# Patient Record
Sex: FEMALE | Race: WHITE | NOT HISPANIC OR LATINO | Employment: FULL TIME | URBAN - METROPOLITAN AREA
[De-identification: names, ages, dates, MRNs, and addresses within clinical notes are randomized per-mention and may not be internally consistent; named-entity substitution may affect disease eponyms.]

---

## 2018-12-18 ENCOUNTER — OFFICE VISIT (OUTPATIENT)
Dept: URGENT CARE | Facility: CLINIC | Age: 33
End: 2018-12-18
Payer: MEDICAID

## 2018-12-18 VITALS
BODY MASS INDEX: 24.44 KG/M2 | SYSTOLIC BLOOD PRESSURE: 121 MMHG | DIASTOLIC BLOOD PRESSURE: 81 MMHG | HEIGHT: 69 IN | RESPIRATION RATE: 18 BRPM | HEART RATE: 89 BPM | WEIGHT: 165 LBS | TEMPERATURE: 99 F | OXYGEN SATURATION: 98 %

## 2018-12-18 DIAGNOSIS — S00.31XA NASAL ABRASION, INITIAL ENCOUNTER: ICD-10-CM

## 2018-12-18 DIAGNOSIS — J98.4 PULMONARY LESION: ICD-10-CM

## 2018-12-18 DIAGNOSIS — R06.2 WHEEZING: Primary | ICD-10-CM

## 2018-12-18 PROCEDURE — 71046 X-RAY EXAM CHEST 2 VIEWS: CPT | Mod: S$GLB,,, | Performed by: RADIOLOGY

## 2018-12-18 PROCEDURE — 94640 AIRWAY INHALATION TREATMENT: CPT | Mod: 59,S$GLB,, | Performed by: PHYSICIAN ASSISTANT

## 2018-12-18 PROCEDURE — 94664 DEMO&/EVAL PT USE INHALER: CPT | Mod: 59,S$GLB,, | Performed by: PHYSICIAN ASSISTANT

## 2018-12-18 PROCEDURE — 99205 OFFICE O/P NEW HI 60 MIN: CPT | Mod: 25,S$GLB,, | Performed by: PHYSICIAN ASSISTANT

## 2018-12-18 RX ORDER — MUPIROCIN 20 MG/G
OINTMENT TOPICAL
Qty: 22 G | Refills: 1 | Status: SHIPPED | OUTPATIENT
Start: 2018-12-18 | End: 2019-01-10 | Stop reason: ALTCHOICE

## 2018-12-18 RX ORDER — VENLAFAXINE HYDROCHLORIDE 225 MG/1
TABLET, EXTENDED RELEASE ORAL
Refills: 5 | COMMUNITY
Start: 2018-11-13

## 2018-12-18 RX ORDER — PREDNISONE 10 MG/1
TABLET ORAL
Qty: 18 TABLET | Refills: 0 | Status: SHIPPED | OUTPATIENT
Start: 2018-12-18 | End: 2019-01-18 | Stop reason: ALTCHOICE

## 2018-12-18 RX ORDER — TRAZODONE HYDROCHLORIDE 100 MG/1
100 TABLET ORAL NIGHTLY
Status: ON HOLD | COMMUNITY
Start: 2017-11-04 | End: 2019-05-21 | Stop reason: HOSPADM

## 2018-12-18 RX ORDER — ALBUTEROL SULFATE 0.83 MG/ML
2.5 SOLUTION RESPIRATORY (INHALATION)
Status: COMPLETED | OUTPATIENT
Start: 2018-12-18 | End: 2018-12-18

## 2018-12-18 RX ORDER — GABAPENTIN 300 MG/1
CAPSULE ORAL
Refills: 5 | COMMUNITY
Start: 2018-11-13 | End: 2019-01-18

## 2018-12-18 RX ORDER — MODAFINIL 100 MG/1
200 TABLET ORAL DAILY
COMMUNITY
End: 2019-02-07 | Stop reason: CLARIF

## 2018-12-18 RX ORDER — PROPRANOLOL HYDROCHLORIDE 20 MG/1
TABLET ORAL
Status: ON HOLD | COMMUNITY
Start: 2017-11-21 | End: 2019-02-19 | Stop reason: HOSPADM

## 2018-12-18 RX ADMIN — ALBUTEROL SULFATE 2.5 MG: 0.83 SOLUTION RESPIRATORY (INHALATION) at 02:12

## 2018-12-18 NOTE — PROGRESS NOTES
"Subjective:       Patient ID: Rola Krause is a 33 y.o. female.    Vitals:  height is 5' 9" (1.753 m) and weight is 74.8 kg (165 lb). Her oral temperature is 98.8 °F (37.1 °C). Her blood pressure is 121/81 and her pulse is 89. Her respiration is 18 and oxygen saturation is 98%.     Chief Complaint: Cough    Pt states wheezing for 10 days cough getting less. Did breathing treatment but no relief      Cough   This is a new problem. The current episode started 1 to 4 weeks ago. The problem has been gradually worsening. The problem occurs constantly. Pertinent negatives include no chills, ear pain, eye redness, fever, hemoptysis, myalgias, rash, sore throat, shortness of breath or wheezing. Nothing aggravates the symptoms. She has tried steroid inhaler for the symptoms. The treatment provided no relief.       Constitution: Negative for chills, sweating, fatigue and fever.   HENT: Negative for ear pain, congestion, sinus pain, sinus pressure, sore throat and voice change.    Neck: Negative for painful lymph nodes.   Eyes: Negative for eye redness.   Respiratory: Positive for cough. Negative for chest tightness, sputum production, bloody sputum, COPD, shortness of breath, stridor, wheezing and asthma.    Gastrointestinal: Negative for nausea and vomiting.   Musculoskeletal: Negative for muscle ache.   Skin: Negative for rash.   Allergic/Immunologic: Negative for seasonal allergies and asthma.   Hematologic/Lymphatic: Negative for swollen lymph nodes.       Objective:      Physical Exam   Constitutional: She is oriented to person, place, and time. She appears well-developed and well-nourished. She is cooperative.  Non-toxic appearance. She does not appear ill. No distress.   HENT:   Head: Normocephalic and atraumatic.   Right Ear: Hearing, tympanic membrane, external ear and ear canal normal.   Left Ear: Hearing, tympanic membrane, external ear and ear canal normal.   Nose: Nose normal. No mucosal edema, " rhinorrhea or nasal deformity. No epistaxis. Right sinus exhibits no maxillary sinus tenderness and no frontal sinus tenderness. Left sinus exhibits no maxillary sinus tenderness and no frontal sinus tenderness.   Mouth/Throat: Uvula is midline, oropharynx is clear and moist and mucous membranes are normal. No trismus in the jaw. Normal dentition. No uvula swelling. No posterior oropharyngeal erythema.   Eyes: Conjunctivae and lids are normal. No scleral icterus.   Sclera clear bilat   Neck: Trachea normal, full passive range of motion without pain and phonation normal. Neck supple.   Cardiovascular: Normal rate, regular rhythm, normal heart sounds, intact distal pulses and normal pulses.   Pulmonary/Chest: Effort normal. No respiratory distress. She has decreased breath sounds. She has wheezes (inspiratory and expiratory) in the right upper field, the right middle field, the right lower field, the left upper field, the left middle field and the left lower field.   Abdominal: Soft. Normal appearance and bowel sounds are normal. She exhibits no distension. There is no tenderness.   Musculoskeletal: Normal range of motion. She exhibits no edema or deformity.   Neurological: She is alert and oriented to person, place, and time. She exhibits normal muscle tone. Coordination normal.   Skin: Skin is warm, dry and intact. She is not diaphoretic. No pallor.   Psychiatric: She has a normal mood and affect. Her speech is normal and behavior is normal. Judgment and thought content normal. Cognition and memory are normal.   Nursing note and vitals reviewed.      Assessment:       1. Wheezing    2. Pulmonary lesion    3. Nasal abrasion, initial encounter        Plan:         Wheezing  -     XR CHEST PA AND LATERAL; Future; Expected date: 12/18/2018  -     Ambulatory referral to Pulmonology  -     predniSONE (DELTASONE) 10 MG tablet; Take two pills po x 5 days, 1 pill po x 5 days, 1/2 pill po until empty. Start 24 hours after  injection.  Dispense: 18 tablet; Refill: 0  -     albuterol nebulizer solution 2.5 mg    Pulmonary lesion  -     Ambulatory referral to Pulmonology  -     predniSONE (DELTASONE) 10 MG tablet; Take two pills po x 5 days, 1 pill po x 5 days, 1/2 pill po until empty. Start 24 hours after injection.  Dispense: 18 tablet; Refill: 0  -     albuterol nebulizer solution 2.5 mg    Nasal abrasion, initial encounter  -     mupirocin (BACTROBAN) 2 % ointment; Apply to affected area 3 times daily  Dispense: 22 g; Refill: 1    Xr Chest Pa And Lateral    Result Date: 12/18/2018  EXAMINATION: XR CHEST PA AND LATERAL CLINICAL HISTORY: Wheezing TECHNIQUE: PA and lateral views of the chest were performed. COMPARISON: None FINDINGS: Consolidation is noted extending to the right lung hilum in the right upper lung zone.  Infectious and neoplastic etiologies are on the differential and follow-up for resolution along with clinical correlation is suggested.  If this fails to resolve CT should be performed.     Consolidation extending to the superior right lung hilum of uncertain etiology, clinical correlation and close follow-up for resolution and/or CT imaging may be of use Electronically signed by: Damian Pinon MD Date:    12/18/2018 Time:    13:52   We discussed imaging findings, physical exam and treatment options at length.  She states that she is getting private insurance of beginning of the year, at which time she will follow-up with pulmonology.  I will provide her with an adult dose her nebulizer solution, for she was using her son's nebulizer treatment at home with left benefit.  Her lung sounds have improved post nebulizer treatment, she states that she can breathe better.  She can continue suggest as needed as well as the above medications.    Patient Instructions   [facility] care at [facility]   Phone: [phone]      Pulmonary Nodule  A pulmonary nodule is small area of abnormal tissue in the lung. It is usually  found on an X-ray taken for other reasons. It is a single spot (lesion) up to about an inch in size, surrounded by normal lung tissue.  Most nodules are not cancerous (benign). However, a nodule could be an early stage of lung cancer. Or it may be a sign of cancer that has spread from another part of the body. When a nodule is found on a chest X-ray, further testing is needed to determine if it is benign or cancerous (malignant). To give your healthcare provider more information about the nodule, you may have one or more of these tests:  · Comparison of a new X-ray to earlier X-rays  · Chest CT scan  · Bronchoscopy (a procedure that allows the healthcare provider to see the air passages inside the lung)  · Needle biopsy  Test results  · If your nodule is benign, continued follow-up over the next 5 years is usually advised.  · If tests do not determine whether your nodule is benign or malignant, surgery may be advised.  · If tests show that the nodule is definitely malignant, surgery will probably be advised.  The best survival rates from lung cancer occur when the original tumor is small (less than 1 inch). Follow your healthcare provider's advice on the timing of further testing. Prompt treatment gives the best chance of curing lung cancer.  Prevention  Smoking remains one of the biggest risk factors for lung cancer. If you smoke, it is essential that you quit to lower your risk of lung cancer. Talk to your healthcare provider about things that can help you quit, including medicines and support groups. See the following websites for more information:  · www.smokefree.gov  · www.quitnet.com  Home care  Most people with a pulmonary nodule have no symptoms. So no special home care is required. You may return to your usual activities and diet.  Follow-up care  Follow up with your healthcare provider, or as advised.  More information about lung cancer is available from these resources:  · American Lung Association:  641.533.9410, www.lung.org  · National Cancer [place]: 117.447.6950, www.cancer.gov  When to seek medical advice  Call your healthcare provider right away if any of these occur:  · Fever of 100.4°F (38°C) or higher  · Unintended weight change  Call 911  Contact emergency services right away if any of these occur:  · Coughing up blood  · Chest pain or shortness of breath  Date Last Reviewed: 9/13/2015  © 4805-7448 [employer]. [address] [place] [address]. All rights reserved. This information is not intended as a substitute for professional medical care. Always follow your healthcare professional's instructions.        Bronchitis, Viral (Adult)    You have a viral bronchitis. Bronchitis is inflammation and swelling of the lining of the lungs. This is often caused by an infection. Symptoms include a dry, hacking cough that is worse at night. The cough may bring up yellow-green mucus. You may also feel short of breath or wheeze. Other symptoms may include tiredness, chest discomfort, and chills.  Bronchitis that is caused by a virus is not treated with antibiotics. Instead, medicines may be given to help relieve symptoms. Symptoms can last up to 2 weeks, although the cough may last much longer.  This illness is contagious during the first few days and is spread through the air by coughing and sneezing, or by direct contact (touching the sick person and then touching your own eyes, nose, or mouth).  Most viral illnesses resolve within 10 to 14 days with rest and simple home remedies, although they may sometimes last for several weeks.  Home care  · If symptoms are severe, rest at home for the first 2 to 3 days. When you go back to your usual activities, don't let yourself get too tired.  · Do not smoke. Also avoid being exposed to secondhand smoke.  · You may use over-the-counter medicine to control fever or pain, unless another pain medicine was prescribed. (Note: If you have chronic liver  or kidney disease or have ever had a stomach ulcer or gastrointestinal bleeding, talk with your healthcare provider before using these medicines. Also talk to your provider if you are taking medicine to prevent blood clots.) Aspirin should never be given to anyone younger than 18 years of age who is ill with a viral infection or fever. It may cause severe liver or brain damage.  · Your appetite may be poor, so a light diet is fine. Avoid dehydration by drinking 6 to 8 glasses of fluids per day (such as water, soft drinks, sports drinks, juices, tea, or soup). Extra fluids will help loosen secretions in the nose and lungs.  · Over-the-counter cough, cold, and sore-throat medicines will not shorten the length of the illness, but they may help to reduce symptoms. (Note: Do not use decongestants if you have high blood pressure.)  Follow-up care  Follow up with your healthcare provider, or as advised. If you had an X-ray or ECG (electrocardiogram), a specialist will review it. You will be notified of any new findings that may affect your care.  Note: If you are age 65 or older, or if you have a chronic lung disease or condition that affects your immune system, or you smoke, talk to your healthcare provider about having pneumococcal vaccinations and a yearly influenza vaccination (flu shot).  When to seek medical advice  Call your healthcare provider right away if any of these occur:  · Fever of 100.4°F (38°C) or higher  · Coughing up increased amounts of colored sputum  · Weakness, drowsiness, headache, facial pain, ear pain, or a stiff neck  Call 911, or get immediate medical care  Contact emergency services right away if any of these occur:  · Coughing up blood  · Worsening weakness, drowsiness, headache, or stiff neck  · Trouble breathing, wheezing, or pain with breathing  Date Last Reviewed: 9/13/2015  © 3691-4261 [employer]. [address] [place] [address], [place] [address]. All rights reserved. This  information is not intended as a substitute for professional medical care. Always follow your healthcare professional's instructions.    If you were prescribed a narcotic medication, do not drive or operate heavy equipment or machinery while taking these medications.  You must understand that you've received an Urgent Care treatment only and that you may be released before all your medical problems are known or treated. You, the patient, will arrange for follow up care as instructed.  Follow up with your PCP or specialty clinic as directed in the next 1-2 weeks if not improved or as needed.  You can call [phone] to schedule an appointment with the appropriate provider.  If your condition worsens we recommend that you receive another evaluation at the emergency room immediately or contact your primary medical clinics after hours call service to discuss your concerns.  Please return here or go to the Emergency Department for any concerns or worsening of condition.

## 2018-12-18 NOTE — PATIENT INSTRUCTIONS
Primary care at [facility]   Phone: [phone]      Pulmonary Nodule  A pulmonary nodule is small area of abnormal tissue in the lung. It is usually found on an X-ray taken for other reasons. It is a single spot (lesion) up to about an inch in size, surrounded by normal lung tissue.  Most nodules are not cancerous (benign). However, a nodule could be an early stage of lung cancer. Or it may be a sign of cancer that has spread from another part of the body. When a nodule is found on a chest X-ray, further testing is needed to determine if it is benign or cancerous (malignant). To give your healthcare provider more information about the nodule, you may have one or more of these tests:  · Comparison of a new X-ray to earlier X-rays  · Chest CT scan  · Bronchoscopy (a procedure that allows the healthcare provider to see the air passages inside the lung)  · Needle biopsy  Test results  · If your nodule is benign, continued follow-up over the next 5 years is usually advised.  · If tests do not determine whether your nodule is benign or malignant, surgery may be advised.  · If tests show that the nodule is definitely malignant, surgery will probably be advised.  The best survival rates from lung cancer occur when the original tumor is small (less than 1 inch). Follow your healthcare provider's advice on the timing of further testing. Prompt treatment gives the best chance of curing lung cancer.  Prevention  Smoking remains one of the biggest risk factors for lung cancer. If you smoke, it is essential that you quit to lower your risk of lung cancer. Talk to your healthcare provider about things that can help you quit, including medicines and support groups. See the following websites for more information:  · www.smokefree.gov  · www.quitnet.com  Home care  Most people with a pulmonary nodule have no symptoms. So no special home care is required. You may return to your usual activities and diet.  Follow-up  care  Follow up with your healthcare provider, or as advised.  More information about lung cancer is available from these resources:  · American Lung Association: 333.957.6635, www.lung.org  · National Cancer [place]: 990.646.3413, www.cancer.gov  When to seek medical advice  Call your healthcare provider right away if any of these occur:  · Fever of 100.4°F (38°C) or higher  · Unintended weight change  Call 911  Contact emergency services right away if any of these occur:  · Coughing up blood  · Chest pain or shortness of breath  Date Last Reviewed: 9/13/2015  © 6628-4832 [employer]. [address] [place] [address], [place] [address]. All rights reserved. This information is not intended as a substitute for professional medical care. Always follow your healthcare professional's instructions.        Bronchitis, Viral (Adult)    You have a viral bronchitis. Bronchitis is inflammation and swelling of the lining of the lungs. This is often caused by an infection. Symptoms include a dry, hacking cough that is worse at night. The cough may bring up yellow-green mucus. You may also feel short of breath or wheeze. Other symptoms may include tiredness, chest discomfort, and chills.  Bronchitis that is caused by a virus is not treated with antibiotics. Instead, medicines may be given to help relieve symptoms. Symptoms can last up to 2 weeks, although the cough may last much longer.  This illness is contagious during the first few days and is spread through the air by coughing and sneezing, or by direct contact (touching the sick person and then touching your own eyes, nose, or mouth).  Most viral illnesses resolve within 10 to 14 days with rest and simple home remedies, although they may sometimes last for several weeks.  Home care  · If symptoms are severe, rest at home for the first 2 to 3 days. When you go back to your usual activities, don't let yourself get too tired.  · Do not smoke. Also avoid being exposed to  secondhand smoke.  · You may use over-the-counter medicine to control fever or pain, unless another pain medicine was prescribed. (Note: If you have chronic liver or kidney disease or have ever had a stomach ulcer or gastrointestinal bleeding, talk with your healthcare provider before using these medicines. Also talk to your provider if you are taking medicine to prevent blood clots.) Aspirin should never be given to anyone younger than 18 years of age who is ill with a viral infection or fever. It may cause severe liver or brain damage.  · Your appetite may be poor, so a light diet is fine. Avoid dehydration by drinking 6 to 8 glasses of fluids per day (such as water, soft drinks, sports drinks, juices, tea, or soup). Extra fluids will help loosen secretions in the nose and lungs.  · Over-the-counter cough, cold, and sore-throat medicines will not shorten the length of the illness, but they may help to reduce symptoms. (Note: Do not use decongestants if you have high blood pressure.)  Follow-up care  Follow up with your healthcare provider, or as advised. If you had an X-ray or ECG (electrocardiogram), a specialist will review it. You will be notified of any new findings that may affect your care.  Note: If you are age 65 or older, or if you have a chronic lung disease or condition that affects your immune system, or you smoke, talk to your healthcare provider about having pneumococcal vaccinations and a yearly influenza vaccination (flu shot).  When to seek medical advice  Call your healthcare provider right away if any of these occur:  · Fever of 100.4°F (38°C) or higher  · Coughing up increased amounts of colored sputum  · Weakness, drowsiness, headache, facial pain, ear pain, or a stiff neck  Call 911, or get immediate medical care  Contact emergency services right away if any of these occur:  · Coughing up blood  · Worsening weakness, drowsiness, headache, or stiff neck  · Trouble breathing, wheezing, or pain  with breathing  Date Last Reviewed: 9/13/2015  © 7345-1745 [employer]. [address] [place] [address], [place] [address]. All rights reserved. This information is not intended as a substitute for professional medical care. Always follow your healthcare professional's instructions.    If you were prescribed a narcotic medication, do not drive or operate heavy equipment or machinery while taking these medications.  You must understand that you've received an Urgent Care treatment only and that you may be released before all your medical problems are known or treated. You, the patient, will arrange for follow up care as instructed.  Follow up with your PCP or specialty clinic as directed in the next 1-2 weeks if not improved or as needed.  You can call [phone] to schedule an appointment with the appropriate provider.  If your condition worsens we recommend that you receive another evaluation at the emergency room immediately or contact your primary medical clinics after hours call service to discuss your concerns.  Please return here or go to the Emergency Department for any concerns or worsening of condition.

## 2019-01-10 PROBLEM — R76.12 POSITIVE QUANTIFERON-TB GOLD TEST: Status: ACTIVE | Noted: 2019-01-10

## 2019-01-10 PROBLEM — J18.9 RIGHT UPPER LOBE PNEUMONIA: Status: ACTIVE | Noted: 2019-01-10

## 2019-01-10 PROBLEM — R07.9 CHEST PAIN: Status: ACTIVE | Noted: 2019-01-10

## 2019-01-10 PROBLEM — R91.8 RIGHT UPPER LOBE PULMONARY INFILTRATE: Status: ACTIVE | Noted: 2019-01-10

## 2019-01-10 PROBLEM — D64.9 NORMOCYTIC ANEMIA: Status: ACTIVE | Noted: 2019-01-10

## 2019-01-16 PROBLEM — R07.81 PLEURITIC CHEST PAIN: Status: ACTIVE | Noted: 2019-01-10

## 2019-01-28 PROBLEM — A31.0 PULMONARY MYCOBACTERIAL INFECTION: Status: ACTIVE | Noted: 2019-01-28

## 2019-01-29 PROBLEM — R76.11 POSITIVE TB TEST: Status: ACTIVE | Noted: 2019-01-29

## 2019-02-11 PROBLEM — J18.9 PNEUMONIA OF UPPER LOBE DUE TO INFECTIOUS ORGANISM: Status: ACTIVE | Noted: 2019-02-11

## 2019-02-15 ENCOUNTER — LAB VISIT (OUTPATIENT)
Dept: LAB | Facility: HOSPITAL | Age: 34
End: 2019-02-15
Attending: RADIOLOGY
Payer: MEDICAID

## 2019-02-15 DIAGNOSIS — C34.11 MALIGNANT NEOPLASM OF RIGHT UPPER LOBE OF LUNG: Primary | ICD-10-CM

## 2019-02-15 PROCEDURE — 86777 TOXOPLASMA ANTIBODY: CPT

## 2019-02-15 PROCEDURE — 86778 TOXOPLASMA ANTIBODY IGM: CPT

## 2019-02-15 PROCEDURE — 36415 COLL VENOUS BLD VENIPUNCTURE: CPT | Mod: PN

## 2019-02-18 PROBLEM — C79.31 METASTASIS TO BRAIN: Status: ACTIVE | Noted: 2019-02-18

## 2019-02-18 PROBLEM — R79.89 ELEVATED TROPONIN: Status: ACTIVE | Noted: 2019-02-18

## 2019-02-18 PROBLEM — J18.9 PNEUMONIA OF RIGHT LOWER LOBE DUE TO INFECTIOUS ORGANISM: Status: ACTIVE | Noted: 2019-02-18

## 2019-02-18 PROBLEM — J96.01 ACUTE HYPOXEMIC RESPIRATORY FAILURE: Status: ACTIVE | Noted: 2019-02-18

## 2019-02-18 PROBLEM — C34.11 MALIGNANT NEOPLASM OF UPPER LOBE OF RIGHT LUNG: Status: ACTIVE | Noted: 2019-02-18

## 2019-02-18 PROBLEM — R00.0 SINUS TACHYCARDIA: Status: ACTIVE | Noted: 2019-02-18

## 2019-02-18 PROBLEM — J96.01 ACUTE RESPIRATORY FAILURE WITH HYPOXIA: Status: ACTIVE | Noted: 2019-02-18

## 2019-02-18 PROBLEM — A41.9 SEPSIS: Status: ACTIVE | Noted: 2019-02-18

## 2019-02-19 PROBLEM — A41.9 SEPSIS: Status: RESOLVED | Noted: 2019-02-18 | Resolved: 2019-02-19

## 2019-02-19 PROBLEM — G47.419 NARCOLEPSY: Status: ACTIVE | Noted: 2019-02-19

## 2019-02-20 LAB
T GONDII IGG SER QL IA: NORMAL
T GONDII IGG SERPL IA-ACNC: <5 IU/ML
T GONDII IGM SER-ACNC: <3 AU/ML

## 2019-02-26 ENCOUNTER — OFFICE VISIT (OUTPATIENT)
Dept: HEMATOLOGY/ONCOLOGY | Facility: CLINIC | Age: 34
End: 2019-02-26
Payer: MEDICAID

## 2019-02-26 VITALS
WEIGHT: 175 LBS | RESPIRATION RATE: 16 BRPM | HEART RATE: 101 BPM | BODY MASS INDEX: 25.92 KG/M2 | DIASTOLIC BLOOD PRESSURE: 69 MMHG | HEIGHT: 69 IN | SYSTOLIC BLOOD PRESSURE: 112 MMHG | TEMPERATURE: 99 F

## 2019-02-26 DIAGNOSIS — C34.11 MALIGNANT NEOPLASM OF UPPER LOBE OF RIGHT LUNG: ICD-10-CM

## 2019-02-26 DIAGNOSIS — D64.9 NORMOCYTIC ANEMIA: ICD-10-CM

## 2019-02-26 DIAGNOSIS — C79.31 METASTASIS TO BRAIN: ICD-10-CM

## 2019-02-26 DIAGNOSIS — J96.01 ACUTE HYPOXEMIC RESPIRATORY FAILURE: Primary | ICD-10-CM

## 2019-02-26 DIAGNOSIS — Z09 ONCOLOGY FOLLOW-UP ENCOUNTER: ICD-10-CM

## 2019-02-26 PROCEDURE — 99999 PR PBB SHADOW E&M-EST. PATIENT-LVL III: CPT | Mod: PBBFAC,,, | Performed by: INTERNAL MEDICINE

## 2019-02-26 PROCEDURE — 99999 PR PBB SHADOW E&M-EST. PATIENT-LVL III: ICD-10-PCS | Mod: PBBFAC,,, | Performed by: INTERNAL MEDICINE

## 2019-02-26 PROCEDURE — 99215 OFFICE O/P EST HI 40 MIN: CPT | Mod: S$PBB,,, | Performed by: INTERNAL MEDICINE

## 2019-02-26 PROCEDURE — 99213 OFFICE O/P EST LOW 20 MIN: CPT | Mod: PBBFAC,PN | Performed by: INTERNAL MEDICINE

## 2019-02-26 PROCEDURE — 99215 PR OFFICE/OUTPT VISIT, EST, LEVL V, 40-54 MIN: ICD-10-PCS | Mod: S$PBB,,, | Performed by: INTERNAL MEDICINE

## 2019-02-26 NOTE — PROGRESS NOTES
HPI    33 years old  female presented to Oncology floor with prior diagnosis of lung cancer Mets to brain.    Patient was complaining of productive cough with bloody sputum increased shortness of breath x1 day.  Patient also complaining of fever.  Patient is scheduled to start radiation to the brain this week.  Patient is also scheduled to have a PET-CT done tomorrow.    Cytology specimen fine-needle on February 11, 2019 by pulmonology shows chest station 7 lymph nodes biopsy positive for adenocarcinoma, chest station 4R lymph node positive for adenocarcinoma and left upper lobe endotracheal lesion biopsy positive for adenocarcinoma.  MRI on February 13, 2019 shows extensive supratentorial intracranial med metastatic disease corresponding to history of adenocarcinoma of the lung recent diagnosis.    Today patient complaining of cough.  Patient denies any chest pain shortness of breath nausea vomiting or diarrhea.  Patient states that her symptoms of started about 2 months the go with initial flu like symptoms seen at urgent care.  At the time images shows lung infiltrate started on antibiotics request to follow up with pulmonology.  Due to her insurance status patient was never able to follow up with pulmonology on time was later found to have lung CA.  Patient was discharged from the hospital.  And patient is here for follow-up regarding evaluation and treatment for her lung cancer.        Past Medical History:   Diagnosis Date    Anxiety     Autonomic dysfunction     Chiari malformation type I     Depressed     Ayesha-Danlos syndrome     Frequent headaches     General anesthetics causing adverse effect in therapeutic use     due to syndrome --metablolize anesthetics very fast    History of UTI     Narcolepsy     with cataplexy    POTS (postural orthostatic tachycardia syndrome)     TB lung, latent      Past Surgical History:   Procedure Laterality Date    BRONCHOSCOPY, WITH FLUOROSCOPY N/A  1/29/2019    Performed by Sanjay Mcmahon Jr., DO at [facility] ENDO    CHOLECYSTECTOMY      CRANIOPLASTY FOR CRANIAL DEFECT      cranial decompression    ENDOBRONCHIAL ULTRASOUND (EBUS) N/A 2/11/2019    Performed by Sanjay Mcmahon Jr., DO at [facility] CSC    LAMINECTOMY      SHOULDER ARTHROSCOPY      right x's 2    TUBAL LIGATION       Family History   Problem Relation Age of Onset    No Known Problems Mother     No Known Problems Father      Social History     Socioeconomic History    Marital status:      Spouse name: Not on file    Number of children: Not on file    Years of education: Not on file    Highest education level: Not on file   Social Needs    Financial resource strain: Not on file    Food insecurity - worry: Not on file    Food insecurity - inability: Not on file    Transportation needs - medical: Not on file    Transportation needs - non-medical: Not on file   Occupational History    Not on file   Tobacco Use    Smoking status: Never Smoker    Smokeless tobacco: Never Used   Substance and Sexual Activity    Alcohol use: Yes     Frequency: Never     Comment: rarely    Drug use: Yes     Types: Cocaine, MDMA (Ecstacy)    Sexual activity: Not on file   Other Topics Concern    Not on file   Social History Narrative    Not on file     Review of patient's allergies indicates:   Allergen Reactions    Sulfa (sulfonamide antibiotics)      Physical exam    Gen:  Alert oriented answering question follows command  HEENT:  Normocephalic atraumatic pupils equal round reactive to light extraocular muscle intact  Neck:  No JVD  Cardiovascular:  Regular rhythm  Pulmonary:  Decreasing breath sounds  Abdomen: soft nontender nondistended  Neuro:  Grossly normal  Extremity:  No edema  Skin:  No lesion or rhonchi  Psych:  Tearful, patient wishes everything to be done with aggressive management        Lab Results   Component Value Date    WBC 12.80 (H) 02/20/2019    HGB 8.8 (L) 02/20/2019    HCT 28.1  (L) 02/20/2019    MCV 85 02/20/2019     02/20/2019     CMP  Sodium   Date Value Ref Range Status   02/20/2019 136 136 - 145 mmol/L Final     Potassium   Date Value Ref Range Status   02/20/2019 4.7 3.5 - 5.1 mmol/L Final     Chloride   Date Value Ref Range Status   02/20/2019 98 95 - 110 mmol/L Final     CO2   Date Value Ref Range Status   02/20/2019 29 22 - 31 mmol/L Final     Glucose   Date Value Ref Range Status   02/20/2019 143 (H) 70 - 110 mg/dL Final     Comment:     The ADA recommends the following guidelines for fasting glucose:  Normal:       less than 100 mg/dL  Prediabetes:  100 mg/dL to 125 mg/dL  Diabetes:     126 mg/dL or higher       BUN, Bld   Date Value Ref Range Status   02/20/2019 12 7 - 18 mg/dL Final     Creatinine   Date Value Ref Range Status   02/20/2019 0.58 0.50 - 1.40 mg/dL Final     Calcium   Date Value Ref Range Status   02/20/2019 9.1 8.4 - 10.2 mg/dL Final     Total Protein   Date Value Ref Range Status   02/18/2019 8.0 6.0 - 8.4 g/dL Final     Albumin   Date Value Ref Range Status   02/18/2019 4.3 3.5 - 5.2 g/dL Final     Total Bilirubin   Date Value Ref Range Status   02/18/2019 0.4 0.2 - 1.3 mg/dL Final     Alkaline Phosphatase   Date Value Ref Range Status   02/18/2019 193 (H) 38 - 145 U/L Final     AST   Date Value Ref Range Status   02/18/2019 38 (H) 14 - 36 U/L Final     ALT   Date Value Ref Range Status   02/18/2019 17 10 - 44 U/L Final     Anion Gap   Date Value Ref Range Status   02/20/2019 9 8 - 16 mmol/L Final     eGFR if    Date Value Ref Range Status   02/20/2019 >60 >60 mL/min/1.73 m^2 Final     eGFR if non    Date Value Ref Range Status   02/20/2019 >60 >60 mL/min/1.73 m^2 Final     Comment:     Calculation used to obtain the estimated glomerular filtration  rate (eGFR) is the CKD-EPI equation.          MRI February 14, 2019  IMPRESSION:  1. Extensive supratentorial intracranial metastatic disease as described above.  2. Chiari  decompression with no evidence of tonsillar herniation or crowding.  Message left for Dr. Mcmahon at 11:05 on 02/15/2019.      February 7, 2019   IMPRESSION  1. Small right pleural effusion is demonstrated.  2. There is patchy bilateral multifocal, nodule, consolidation most pronounced with masslike consolidative coalescence of the right upper lobe, perihilar region. This corresponds with bronchial narrowing, partial obstructive appearance as well as   abnormally enlarged adjacent mediastinal, hilar lymph nodes.    January 29, 2019 pathology surgery non biopsy right upper lobe transbronchial  DIAGNOSIS:   01/30/19 JL:cc    RIGHT UPPER LOBE TRANSBRONCHIAL LUNG BIOPSY:  - NECROTIC NODULE WITH REACTIVE FIBROSIS.      February 11, 2019 cytology specimen fine needle    DIAGNOSIS:   02/12/19    JL/sdc    1.  CHEST STATION 7 LYMPH NODE, FINE NEEDLE ASPIRATE (EBUS, 8 PASSES):  - POSITIVE FOR ADENOCARCINOMA.     2.  CHEST STATION 4R LYMPH NODE, FINE NEEDLE ASPIRATE (8 PASSES):  - POSITIVE FOR ADENOCARCINOMA.     3.  LEFT UPPER LOBE ENDOBRONCHIAL LESION, FINE NEEDLE ASPIRATE (5   PASSES):  - POSITIVE FOR ADENOCARCINOMA.      February 12, 2019:    EGFR mutation Exon 19,21, 20 all negative  Alk fusion EML4 negative   ROS1 fusion CD 74, SDC4, NDA22C5, EZR, TPM3 negative   RET Fusion KIF5B, CCDC6, TRIM33 all TNP  KRAS mutation all negative   BRAF mutation negative       PDL1 14% expression         Impression MRI February 13, 2019       1. Extensive supratentorial intracranial metastatic disease as described above.  2. Chiari decompression with no evidence of tonsillar herniation or crowding.  Message left for Dr. Mcmahon at 11:05 on 02/15/2019.         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  Negative targeted mutation study including EGFR L carotids B Holger and RTE.    -ECOG performance score 1  -PD L1 14% suppression  -patient is currently on radiation therapy with Dr. Scott.   -CT-PET for  staging  -RTC 10 days to discuss chemotherapy  -will consult for port placement

## 2019-02-26 NOTE — LETTER
February 26, 2019      Sanjay Mcmahon Jr., DO  [address]  [address]  [place] [address]           [facility]  [address]  [facility] [facility] [address]  Phone: [phone]  Fax: [phone]          Patient: Rola Krause   MR Number: [id number]   YOB: 1985   Date of Visit: 2/26/2019       Dear Dr. Sanjay Mcmahon Jr.:    Thank you for referring Rola Krause to me for evaluation. Attached you will find relevant portions of my assessment and plan of care.    If you have questions, please do not hesitate to call me. I look forward to following Rola Krause along with you.    Sincerely,    Pato Rizzo MD    Enclosure  CC:  No Recipients    If you would like to receive this communication electronically, please contact [email or web address] or [phone] to request more information on [employer] Link access.    For providers and/or their staff who would like to refer a patient to [facility], please contact us through our one-stop-shop provider referral line, [facility], at [phone].    If you feel you have received this communication in error or would no longer like to receive these types of communications, please e-mail [email or web address]

## 2019-02-27 ENCOUNTER — TELEPHONE (OUTPATIENT)
Dept: HEMATOLOGY/ONCOLOGY | Facility: CLINIC | Age: 34
End: 2019-02-27

## 2019-02-28 ENCOUNTER — OFFICE VISIT (OUTPATIENT)
Dept: SURGERY | Facility: CLINIC | Age: 34
End: 2019-02-28
Payer: MEDICAID

## 2019-02-28 VITALS
HEIGHT: 69 IN | HEART RATE: 115 BPM | BODY MASS INDEX: 25.92 KG/M2 | TEMPERATURE: 97 F | SYSTOLIC BLOOD PRESSURE: 109 MMHG | DIASTOLIC BLOOD PRESSURE: 71 MMHG | WEIGHT: 175 LBS

## 2019-02-28 DIAGNOSIS — C34.90 MALIGNANT NEOPLASM OF LUNG, UNSPECIFIED LATERALITY, UNSPECIFIED PART OF LUNG: Primary | ICD-10-CM

## 2019-02-28 PROCEDURE — 99203 OFFICE O/P NEW LOW 30 MIN: CPT | Mod: S$PBB,,, | Performed by: SURGERY

## 2019-02-28 PROCEDURE — 99999 PR PBB SHADOW E&M-EST. PATIENT-LVL III: CPT | Mod: PBBFAC,,, | Performed by: SURGERY

## 2019-02-28 PROCEDURE — 99213 OFFICE O/P EST LOW 20 MIN: CPT | Mod: PBBFAC,PO | Performed by: SURGERY

## 2019-02-28 PROCEDURE — 99999 PR PBB SHADOW E&M-EST. PATIENT-LVL III: ICD-10-PCS | Mod: PBBFAC,,, | Performed by: SURGERY

## 2019-02-28 PROCEDURE — 99203 PR OFFICE/OUTPT VISIT, NEW, LEVL III, 30-44 MIN: ICD-10-PCS | Mod: S$PBB,,, | Performed by: SURGERY

## 2019-02-28 NOTE — LETTER
February 28, 2019      Pato Rizzo MD  [address]  [address]  [place] [address]           [facility]  [address] [facility] [address] [place] [address]  Phone: [phone]          Patient: Rola Krause   MR Number: [id number]   YOB: 1985   Date of Visit: 2/28/2019       Dear Dr. Pato Rizzo:    Thank you for referring Rola Krause to me for evaluation. Attached you will find relevant portions of my assessment and plan of care.    If you have questions, please do not hesitate to call me. I look forward to following Rola Krause along with you.    Sincerely,    Buddy Gleason MD    Enclosure  CC:  No Recipients    If you would like to receive this communication electronically, please contact [email or web address] or [phone] to request more information on [employer] Link access.    For providers and/or their staff who would like to refer a patient to [facility], please contact us through our one-stop-shop provider referral line, [facility], at [phone].    If you feel you have received this communication in error or would no longer like to receive these types of communications, please e-mail [email or web address]

## 2019-02-28 NOTE — PATIENT INSTRUCTIONS
Surgery is scheduled for 03/13/19 arrival time per the preop nurse.  Preop will call from [phone]  Fasting after midnight  Someone to drive you home      THE PREOP NURSE WILL CALL, SOMETIMES AS LATE AS 4 PM IN THE AFTERNOON THE DAY BEFORE SURGERY.    Bathe the night before and the morning of your procedure with a Chlorhexidine wash such as Hibiclens, can be purchased at most Pharmacy's.    Special Instruction: no

## 2019-02-28 NOTE — PROGRESS NOTES
Subjective:       Patient ID: Rloa Krause is a 33 y.o. female.    Chief Complaint: Consult (Port placement)    HPI  Pleasant 34 yo F referred to me for evaluation of port placement.  Pt with recent diagnosis metastatic lung cancer.  Pt with lung adenocarcinoma of the RULobe of lung with brain mets.  Pt is getting radiation therapy at present and will need chemo after.  She has been referred to me for a port.  She denies chest surgery and no history of ESRD.   Review of Systems   Constitutional: Positive for unexpected weight change. Negative for activity change, appetite change and fever.   Respiratory: Positive for chest tightness, shortness of breath and wheezing.    Cardiovascular: Positive for chest pain.   Gastrointestinal: Negative for abdominal distention, abdominal pain, constipation, diarrhea, nausea and vomiting.   Genitourinary: Negative for difficulty urinating, dysuria and frequency.   Skin: Negative for wound.   Neurological: Positive for weakness. Negative for dizziness.   Hematological: Negative for adenopathy. Does not bruise/bleed easily.   Psychiatric/Behavioral: Negative for agitation and decreased concentration.       Objective:      Physical Exam   Constitutional: No distress.   HENT:   Head: Normocephalic and atraumatic.   Cardiovascular: Normal rate and regular rhythm.   Pulmonary/Chest: No stridor. No respiratory distress.   Abdominal: Soft. She exhibits no distension. There is no tenderness. There is no guarding.   Skin: She is not diaphoretic.   Vitals reviewed.      Assessment:     metastatic lung cancer   No diagnosis found.    Plan:       D/w pt.  I  haclaritza offered to place port.   Risks and benefits of port placement were d/w pt.  Informed consent obtained.  Surgery scheduled for 3/13

## 2019-03-01 RX ORDER — LIDOCAINE HYDROCHLORIDE 10 MG/ML
1 INJECTION, SOLUTION EPIDURAL; INFILTRATION; INTRACAUDAL; PERINEURAL ONCE
Status: CANCELLED | OUTPATIENT
Start: 2019-03-01 | End: 2019-03-01

## 2019-03-01 RX ORDER — SODIUM CHLORIDE 9 MG/ML
INJECTION, SOLUTION INTRAVENOUS CONTINUOUS
Status: CANCELLED | OUTPATIENT
Start: 2019-03-01

## 2019-03-07 ENCOUNTER — TELEPHONE (OUTPATIENT)
Dept: SURGERY | Facility: CLINIC | Age: 34
End: 2019-03-07

## 2019-03-07 ENCOUNTER — TELEPHONE (OUTPATIENT)
Dept: HEMATOLOGY/ONCOLOGY | Facility: CLINIC | Age: 34
End: 2019-03-07

## 2019-03-07 NOTE — TELEPHONE ENCOUNTER
Spoke with patient to confirm that she was delaying the port placement.  She stated that the radiation has been delayed and she delayed the port placement also.  She also stated that she is considering not moving forward with treatment.  I asked if she had a support system to discuss her consequences and she stated that she had a very good support system and would be providing us with a decision soon.  Informed her that if she would like to speak with Dr. Rizzo I could make her an appointment and she stated that she would most likely speak with him, but she would let me know when she is ready to discuss her options and decisions with the physician.  I verbalized understanding.  Dr. Rizzo made aware of the situation.

## 2019-03-07 NOTE — TELEPHONE ENCOUNTER
----- Message from Bushra Gonzalez sent at 3/7/2019 12:29 PM CST -----  Contact: pt  Pt would like to reschedule her procedure due to radiation being pushed back and other reasons regarding her cancer  Please call to reschedule  Call back   Thanks

## 2019-03-12 ENCOUNTER — DOCUMENTATION ONLY (OUTPATIENT)
Dept: INFUSION THERAPY | Facility: HOSPITAL | Age: 34
End: 2019-03-12

## 2019-03-12 ENCOUNTER — INFUSION (OUTPATIENT)
Dept: INFUSION THERAPY | Facility: HOSPITAL | Age: 34
End: 2019-03-12
Attending: RADIOLOGY
Payer: MEDICAID

## 2019-03-12 VITALS
DIASTOLIC BLOOD PRESSURE: 63 MMHG | SYSTOLIC BLOOD PRESSURE: 104 MMHG | OXYGEN SATURATION: 95 % | TEMPERATURE: 98 F | RESPIRATION RATE: 16 BRPM | HEART RATE: 118 BPM

## 2019-03-12 VITALS
HEIGHT: 69 IN | WEIGHT: 165.81 LBS | BODY MASS INDEX: 24.56 KG/M2 | TEMPERATURE: 98 F | SYSTOLIC BLOOD PRESSURE: 100 MMHG | HEART RATE: 109 BPM | RESPIRATION RATE: 17 BRPM | DIASTOLIC BLOOD PRESSURE: 73 MMHG

## 2019-03-12 DIAGNOSIS — D64.9 NORMOCYTIC ANEMIA: ICD-10-CM

## 2019-03-12 DIAGNOSIS — C79.31 METASTASIS TO BRAIN: ICD-10-CM

## 2019-03-12 DIAGNOSIS — R91.8 RIGHT UPPER LOBE PULMONARY INFILTRATE: ICD-10-CM

## 2019-03-12 DIAGNOSIS — C34.11 MALIGNANT NEOPLASM OF UPPER LOBE OF RIGHT LUNG: Primary | ICD-10-CM

## 2019-03-12 PROCEDURE — 96360 HYDRATION IV INFUSION INIT: CPT | Mod: PN

## 2019-03-12 PROCEDURE — A4216 STERILE WATER/SALINE, 10 ML: HCPCS | Mod: PN

## 2019-03-12 PROCEDURE — 25000003 PHARM REV CODE 250: Mod: PN

## 2019-03-12 RX ORDER — HEPARIN 100 UNIT/ML
500 SYRINGE INTRAVENOUS
Status: CANCELLED | OUTPATIENT
Start: 2019-03-12

## 2019-03-12 RX ORDER — SODIUM CHLORIDE 0.9 % (FLUSH) 0.9 %
10 SYRINGE (ML) INJECTION
Status: DISCONTINUED | OUTPATIENT
Start: 2019-03-12 | End: 2019-03-12 | Stop reason: HOSPADM

## 2019-03-12 RX ORDER — PROMETHAZINE HYDROCHLORIDE 25 MG/1
25 TABLET ORAL EVERY 4 HOURS PRN
COMMUNITY
Start: 2019-03-12

## 2019-03-12 RX ORDER — DEXAMETHASONE 2 MG/1
2 TABLET ORAL DAILY PRN
Status: ON HOLD | COMMUNITY
Start: 2019-03-12 | End: 2019-04-12 | Stop reason: HOSPADM

## 2019-03-12 RX ORDER — SODIUM CHLORIDE 0.9 % (FLUSH) 0.9 %
10 SYRINGE (ML) INJECTION
Status: CANCELLED | OUTPATIENT
Start: 2019-03-12

## 2019-03-12 RX ADMIN — SODIUM CHLORIDE, PRESERVATIVE FREE 10 ML: 5 INJECTION INTRAVENOUS at 12:03

## 2019-03-12 RX ADMIN — SODIUM CHLORIDE 1000 ML: 9 INJECTION, SOLUTION INTRAVENOUS at 12:03

## 2019-03-12 NOTE — PLAN OF CARE
Problem: Adult Inpatient Plan of Care  Goal: Plan of Care Review  Outcome: Ongoing (interventions implemented as appropriate)  Pt. Completed treatment, tolerated without noted distress.Vtial signs stable. Patient discharged from infusion center via WC by mother and aunt. Patient had all present questions answered.

## 2019-03-21 ENCOUNTER — TELEPHONE (OUTPATIENT)
Dept: HEMATOLOGY/ONCOLOGY | Facility: CLINIC | Age: 34
End: 2019-03-21

## 2019-03-21 ENCOUNTER — DOCUMENTATION ONLY (OUTPATIENT)
Dept: INFUSION THERAPY | Facility: HOSPITAL | Age: 34
End: 2019-03-21

## 2019-03-21 NOTE — PROGRESS NOTES
LCSW received call from pt who presented with  worried and sad affect. Pt states that she was unable to see Dr. Nguyen today due to the appt having been canceled. Pt states she had been looking forward to the assistance of palliative care in order to help with goals of care. Pt requested that LCSW assist with finding an alternative means of obtaining this care.    LCSW contacted Kevin at Palliative care who states that Dr. Nguyen is very aware of the circumstances of this cancellation as well as the pt's need for palliative care services. Kevin stated that if they are able to put her back on the schedule that he could try to offer an appt for Monday 3/25/19. LCSW called pt again and shared this information. LCSW will follow up with pt to assist with her pursuit of palliative care services and to provide continued psychosocial support. Bonny Quezada LCSW

## 2019-03-21 NOTE — PROGRESS NOTES
Late Entry: On 2/27/19, met with patient at [facility]  to provide psychosocial support. Pt states that she is struggling  with treatment decisionmaking. Pt states that she understands that she has a poor prognosis and she is concerned that she is able to maintain good quality of life so as to be as available as possible to her young children in her remaining time. LCSW acknowledged concerns and provided supportive care throughout pt's expression of painful feelings. LCSW provided information on palliative care as possible resource. LCSW provided material aimed at helping patients with advance care planning and end of life decision making. LCSW encouraged pt to have further conversations with healthcare team regarding goals of care. Assisted pt in formulating questions for pt to ask providers regarding treatment expectations, prognosis, symptom management, etc. Encouraged pt to reach out as needed for further psycosocial support. Bonny Quezada LCSW

## 2019-03-21 NOTE — TELEPHONE ENCOUNTER
Per Dr. Rizzo - called patient, scheduled follow up with patient on Tuesday 3/26/19 at 10:20 am to discuss treatment

## 2019-03-25 ENCOUNTER — DOCUMENTATION ONLY (OUTPATIENT)
Dept: INFUSION THERAPY | Facility: HOSPITAL | Age: 34
End: 2019-03-25

## 2019-03-25 NOTE — PROGRESS NOTES
On Friday afternoon of 3/22/19, pt contacted me via phone to voice continued desire for an appointment with palliative care. I informed pt that I would continue to advocate on her behalf.  On Monday 3/25/2019,I contacted Dagmar at Dr. Rizzo's office and informed her of the patient's request. I explained that pt would like the opinion of a palliative care specialist to supplement the care of  Dr. Rizzo with regard to goals of care. Dagmar voiced understanding and stated that she believed that Dr. Rizzo would be open to this consult. I then spoke with Elizabeth at Palliative Care and informed her of above. Bonny Quezada LCSW

## 2019-03-26 ENCOUNTER — TELEPHONE (OUTPATIENT)
Dept: PHARMACY | Facility: CLINIC | Age: 34
End: 2019-03-26

## 2019-03-26 ENCOUNTER — OFFICE VISIT (OUTPATIENT)
Dept: HEMATOLOGY/ONCOLOGY | Facility: CLINIC | Age: 34
End: 2019-03-26
Payer: MEDICAID

## 2019-03-26 VITALS
WEIGHT: 165.81 LBS | DIASTOLIC BLOOD PRESSURE: 74 MMHG | HEART RATE: 108 BPM | RESPIRATION RATE: 18 BRPM | TEMPERATURE: 98 F | SYSTOLIC BLOOD PRESSURE: 103 MMHG | HEIGHT: 69 IN | BODY MASS INDEX: 24.56 KG/M2

## 2019-03-26 DIAGNOSIS — C34.11 MALIGNANT NEOPLASM OF UPPER LOBE OF RIGHT LUNG: Primary | ICD-10-CM

## 2019-03-26 DIAGNOSIS — C79.31 METASTASIS TO BRAIN: ICD-10-CM

## 2019-03-26 DIAGNOSIS — R91.8 RIGHT UPPER LOBE PULMONARY INFILTRATE: ICD-10-CM

## 2019-03-26 DIAGNOSIS — Z09 ONCOLOGY FOLLOW-UP ENCOUNTER: ICD-10-CM

## 2019-03-26 PROCEDURE — 99999 PR PBB SHADOW E&M-EST. PATIENT-LVL III: CPT | Mod: PBBFAC,,, | Performed by: INTERNAL MEDICINE

## 2019-03-26 PROCEDURE — 99999 PR PBB SHADOW E&M-EST. PATIENT-LVL III: ICD-10-PCS | Mod: PBBFAC,,, | Performed by: INTERNAL MEDICINE

## 2019-03-26 PROCEDURE — 99215 PR OFFICE/OUTPT VISIT, EST, LEVL V, 40-54 MIN: ICD-10-PCS | Mod: S$PBB,,, | Performed by: INTERNAL MEDICINE

## 2019-03-26 PROCEDURE — 99213 OFFICE O/P EST LOW 20 MIN: CPT | Mod: PBBFAC,PN | Performed by: INTERNAL MEDICINE

## 2019-03-26 PROCEDURE — 99215 OFFICE O/P EST HI 40 MIN: CPT | Mod: S$PBB,,, | Performed by: INTERNAL MEDICINE

## 2019-03-26 NOTE — TELEPHONE ENCOUNTER
LVM for callback to inform patient [facility] received prescription for Xalkori and prior authorization is required.  [facility] will be back in touch once insurance determination is received.

## 2019-03-26 NOTE — PROGRESS NOTES
HPI    33 years old  female presented to Oncology floor with prior diagnosis of lung cancer Mets to brain.    Patient was complaining of productive cough with bloody sputum increased shortness of breath x1 day.  Patient also complaining of fever.  Patient is scheduled to start radiation to the brain this week.  Patient is also scheduled to have a PET-CT done tomorrow.    Cytology specimen fine-needle on February 11, 2019 by pulmonology shows chest station 7 lymph nodes biopsy positive for adenocarcinoma, chest station 4R lymph node positive for adenocarcinoma and left upper lobe endotracheal lesion biopsy positive for adenocarcinoma.  MRI on February 13, 2019 shows extensive supratentorial intracranial med metastatic disease corresponding to history of adenocarcinoma of the lung recent diagnosis.    Today patient complaining of cough.  Patient denies any chest pain shortness of breath nausea vomiting or diarrhea.  Patient states that her symptoms of started about 2 months the go with initial flu like symptoms seen at urgent care.  At the time images shows lung infiltrate started on antibiotics request to follow up with pulmonology.  Due to her insurance status patient was never able to follow up with pulmonology on time was later found to have lung CA.  Patient was discharged from the hospital.  And patient is here for follow-up regarding evaluation and treatment for her lung cancer.        Past Medical History:   Diagnosis Date    Anxiety     Autonomic dysfunction     Chiari malformation type I     Depressed     Ayesha-Danlos syndrome     Frequent headaches     General anesthetics causing adverse effect in therapeutic use     due to syndrome --metablolize anesthetics very fast    History of UTI     Lung cancer     Narcolepsy     with cataplexy    POTS (postural orthostatic tachycardia syndrome)     TB lung, latent      Past Surgical History:   Procedure Laterality Date    BRONCHOSCOPY, WITH  FLUOROSCOPY N/A 1/29/2019    Performed by Sanjay Mcmahon Jr., DO at [facility] ENDO    CHOLECYSTECTOMY      CRANIOPLASTY FOR CRANIAL DEFECT      cranial decompression    ENDOBRONCHIAL ULTRASOUND (EBUS) N/A 2/11/2019    Performed by Sanjay Mcmahon Jr., DO at [facility] CSC    LAMINECTOMY      SHOULDER ARTHROSCOPY      right x's 2    TUBAL LIGATION       Family History   Problem Relation Age of Onset    No Known Problems Mother     No Known Problems Father      Social History     Socioeconomic History    Marital status:      Spouse name: None    Number of children: None    Years of education: None    Highest education level: None   Occupational History    None   Social Needs    Financial resource strain: None    Food insecurity:     Worry: None     Inability: None    Transportation needs:     Medical: None     Non-medical: None   Tobacco Use    Smoking status: Never Smoker    Smokeless tobacco: Never Used   Substance and Sexual Activity    Alcohol use: Yes     Frequency: Never     Comment: rarely    Drug use: Yes     Types: Cocaine, MDMA (Ecstacy)    Sexual activity: None   Lifestyle    Physical activity:     Days per week: None     Minutes per session: None    Stress: None   Relationships    Social connections:     Talks on phone: None     Gets together: None     Attends Synagogue service: None     Active member of club or organization: None     Attends meetings of clubs or organizations: None     Relationship status: None    Intimate partner violence:     Fear of current or ex partner: None     Emotionally abused: None     Physically abused: None     Forced sexual activity: None   Other Topics Concern    None   Social History Narrative    None     Review of patient's allergies indicates:   Allergen Reactions    Sulfa (sulfonamide antibiotics)      Physical exam    Gen:  Alert oriented answering question follows command  HEENT:  Normocephalic atraumatic pupils equal round reactive to light  extraocular muscle intact  Neck:  No JVD  Cardiovascular:  Regular rhythm  Pulmonary:  Decreasing breath sounds  Abdomen: soft nontender nondistended  Neuro:  Grossly normal  Extremity:  No edema  Skin:  No lesion or rhonchi  Psych:  Tearful, patient wishes everything to be done with aggressive management        Lab Results   Component Value Date    WBC 12.80 (H) 02/20/2019    HGB 8.8 (L) 02/20/2019    HCT 28.1 (L) 02/20/2019    MCV 85 02/20/2019     02/20/2019     CMP  Sodium   Date Value Ref Range Status   02/20/2019 136 136 - 145 mmol/L Final     Potassium   Date Value Ref Range Status   02/20/2019 4.7 3.5 - 5.1 mmol/L Final     Chloride   Date Value Ref Range Status   02/20/2019 98 95 - 110 mmol/L Final     CO2   Date Value Ref Range Status   02/20/2019 29 22 - 31 mmol/L Final     Glucose   Date Value Ref Range Status   02/20/2019 143 (H) 70 - 110 mg/dL Final     Comment:     The ADA recommends the following guidelines for fasting glucose:  Normal:       less than 100 mg/dL  Prediabetes:  100 mg/dL to 125 mg/dL  Diabetes:     126 mg/dL or higher       BUN, Bld   Date Value Ref Range Status   02/20/2019 12 7 - 18 mg/dL Final     Creatinine   Date Value Ref Range Status   02/20/2019 0.58 0.50 - 1.40 mg/dL Final     Calcium   Date Value Ref Range Status   02/20/2019 9.1 8.4 - 10.2 mg/dL Final     Total Protein   Date Value Ref Range Status   02/18/2019 8.0 6.0 - 8.4 g/dL Final     Albumin   Date Value Ref Range Status   02/18/2019 4.3 3.5 - 5.2 g/dL Final     Total Bilirubin   Date Value Ref Range Status   02/18/2019 0.4 0.2 - 1.3 mg/dL Final     Alkaline Phosphatase   Date Value Ref Range Status   02/18/2019 193 (H) 38 - 145 U/L Final     AST   Date Value Ref Range Status   02/18/2019 38 (H) 14 - 36 U/L Final     ALT   Date Value Ref Range Status   02/18/2019 17 10 - 44 U/L Final     Anion Gap   Date Value Ref Range Status   02/20/2019 9 8 - 16 mmol/L Final     eGFR if    Date Value Ref  Range Status   02/20/2019 >60 >60 mL/min/1.73 m^2 Final     eGFR if non    Date Value Ref Range Status   02/20/2019 >60 >60 mL/min/1.73 m^2 Final     Comment:     Calculation used to obtain the estimated glomerular filtration  rate (eGFR) is the CKD-EPI equation.          MRI February 14, 2019  IMPRESSION:  1. Extensive supratentorial intracranial metastatic disease as described above.  2. Chiari decompression with no evidence of tonsillar herniation or crowding.  Message left for Dr. Mcmahon at 11:05 on 02/15/2019.      February 7, 2019   IMPRESSION  1. Small right pleural effusion is demonstrated.  2. There is patchy bilateral multifocal, nodule, consolidation most pronounced with masslike consolidative coalescence of the right upper lobe, perihilar region. This corresponds with bronchial narrowing, partial obstructive appearance as well as   abnormally enlarged adjacent mediastinal, hilar lymph nodes.    January 29, 2019 pathology surgery non biopsy right upper lobe transbronchial  DIAGNOSIS:   01/30/19 JL:melvi    RIGHT UPPER LOBE TRANSBRONCHIAL LUNG BIOPSY:  - NECROTIC NODULE WITH REACTIVE FIBROSIS.      February 11, 2019 cytology specimen fine needle    DIAGNOSIS:   02/12/19    JL/sdc    1.  CHEST STATION 7 LYMPH NODE, FINE NEEDLE ASPIRATE (EBUS, 8 PASSES):  - POSITIVE FOR ADENOCARCINOMA.     2.  CHEST STATION 4R LYMPH NODE, FINE NEEDLE ASPIRATE (8 PASSES):  - POSITIVE FOR ADENOCARCINOMA.     3.  LEFT UPPER LOBE ENDOBRONCHIAL LESION, FINE NEEDLE ASPIRATE (5   PASSES):  - POSITIVE FOR ADENOCARCINOMA.      February 12, 2019:    EGFR mutation Exon 19,21, 20 all negative  Alk fusion EML4 negative   ROS1 fusion CD 74, SDC4, HYW86J8, EZR, TPM3 negative   RET Fusion KIF5B, CCDC6, TRIM33 all TNP  KRAS mutation all negative   BRAF mutation negative       PDL1 14% expression         Impression MRI February 13, 2019       1. Extensive supratentorial intracranial metastatic disease as described above.  2.  Chiari decompression with no evidence of tonsillar herniation or crowding.  Message left for Dr. Mcmahon at 11:05 on 02/15/2019.         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  Negative targeted mutation study including EGFR, Alk, BRAF, RET.  Positive for ROS1 .    -ECOG performance score 1  -PD L1 14% suppression  -due to radiation with Dr. Rich      [] Patient was previously expressed interest not undergo chemotherapy per for palliation only.  In the light of ROS1 positive mutation, we will discuss potential targeted therapy for her disease.  -patient agree with crizotinib 250mg PO Bid to disease progression or intolerable toxicity.

## 2019-03-27 ENCOUNTER — TELEPHONE (OUTPATIENT)
Dept: PHARMACY | Facility: CLINIC | Age: 34
End: 2019-03-27

## 2019-03-27 NOTE — TELEPHONE ENCOUNTER
DOCUMENTATION ONLY:  Prior authorization for Xalkori 250mg approved from 03/27/2019 to 09/27/2019  Case ID: [id number]    Co-pay: $3.00    Patient Assistance  IS NOT required. Sending to clinical pharmacist for  and shipment. Margaret ESCALANTE

## 2019-03-27 NOTE — TELEPHONE ENCOUNTER
Initial Xalkori consult completed on 3/27/2019 . Xalkori will be shipped on 3/28/2019 to arrive at patient's home on 3/29/2019 via [employer] $3.00 copay. Patient plans to start Xalkori on 2019 if cleared by provider. Address confirmed, CC on file. Confirmed 2 patient identifiers - name and . Therapy Appropriate.     Patient was counseled on the administration directions:  Take 1 capsule(250mg) by mouth twice daily, with or without food  Do not chew, crush, or break the tablets.   If possible, patient was instructed tip the tablets from the RX bottle to the cap, and take directly from the cap to the mouth.  Patient may handle the medication with their hands if they wear with a latex or nitrile glove and wash their hands before and after handling the tablets.    Patient was counseled on the following possible side effects, which include, but are not limited to: nausea, vomiting, diarrhea, edema, rash, fatigue, myelosuppression, neuropathy, visual disturbances.      DDIs: Medication list reviewed, Cat C with xalkori  - dexamethasone, oxycodone, trazodone, venlafaxine: inc conc, monitor for a/es  - zofran: inc risk of QTc prolongation,  QTc 426 ms. No action necessary     Patient was given 2 patient education handouts on how to handle oral chemotherapy and specific recommendations- do's and don'ts. Instructed the patient that if they have any remaining oral chemotherapy, not to flush down the toilet or throw away in the trash; the patient or caregiver should return the unused oral chemotherapy to either the clinic or to myself in the Pharmacy where the oral chemotherapy can be disposed of properly.     Patient confirmed understanding. Patient did not have additional questions.   Patient plans to start Xalkori on 2019 if cleared by provider.  Consultation included: indication; goals of treatment; administration; storage and handling; side effects; how to handle side effects; the importance of compliance; how  to handle missed doses; the importance of laboratory monitoring; the importance of keeping all follow up appointments.  Patient understands to report any medication changes to [facility] and provider. All questions answered and addressed to patients satisfaction. I will f/u with patient in 1 week from start, [facility] to contact patient in 3 weeks for refills.

## 2019-03-29 ENCOUNTER — TELEPHONE (OUTPATIENT)
Dept: HEMATOLOGY/ONCOLOGY | Facility: CLINIC | Age: 34
End: 2019-03-29

## 2019-03-29 NOTE — TELEPHONE ENCOUNTER
Confirmed with patient date, time and location of lab and clinic appt for 4/2 with Dr. Rizzo.  Pt verbalized understanding.

## 2019-03-31 PROBLEM — M54.9 ACUTE BILATERAL BACK PAIN: Status: ACTIVE | Noted: 2019-03-31

## 2019-03-31 PROBLEM — M54.9 BACK PAIN: Status: ACTIVE | Noted: 2019-03-31

## 2019-04-01 PROBLEM — M54.9 INTRACTABLE BACK PAIN: Status: ACTIVE | Noted: 2019-04-01

## 2019-04-06 PROBLEM — R00.0 SINUS TACHYCARDIA: Status: RESOLVED | Noted: 2019-02-18 | Resolved: 2019-04-06

## 2019-04-11 PROBLEM — R52 INTRACTABLE PAIN: Status: ACTIVE | Noted: 2019-04-11

## 2019-04-12 DIAGNOSIS — C79.31 METASTASIS TO BRAIN: Primary | ICD-10-CM

## 2019-04-12 DIAGNOSIS — C34.11 MALIGNANT NEOPLASM OF UPPER LOBE OF RIGHT LUNG: ICD-10-CM

## 2019-04-16 ENCOUNTER — INFUSION (OUTPATIENT)
Dept: INFUSION THERAPY | Facility: HOSPITAL | Age: 34
End: 2019-04-16
Attending: RADIOLOGY
Payer: MEDICAID

## 2019-04-16 ENCOUNTER — TELEPHONE (OUTPATIENT)
Dept: HEMATOLOGY/ONCOLOGY | Facility: CLINIC | Age: 34
End: 2019-04-16

## 2019-04-16 VITALS
RESPIRATION RATE: 98 BRPM | OXYGEN SATURATION: 99 % | HEART RATE: 97 BPM | DIASTOLIC BLOOD PRESSURE: 62 MMHG | SYSTOLIC BLOOD PRESSURE: 112 MMHG

## 2019-04-16 DIAGNOSIS — C79.31 METASTASIS TO BRAIN: ICD-10-CM

## 2019-04-16 DIAGNOSIS — R91.8 RIGHT UPPER LOBE PULMONARY INFILTRATE: ICD-10-CM

## 2019-04-16 DIAGNOSIS — C34.11 MALIGNANT NEOPLASM OF UPPER LOBE OF RIGHT LUNG: Primary | ICD-10-CM

## 2019-04-16 DIAGNOSIS — D64.9 NORMOCYTIC ANEMIA: ICD-10-CM

## 2019-04-16 PROCEDURE — 96360 HYDRATION IV INFUSION INIT: CPT | Mod: PN

## 2019-04-16 PROCEDURE — 25000003 PHARM REV CODE 250: Mod: PN

## 2019-04-16 PROCEDURE — 96374 THER/PROPH/DIAG INJ IV PUSH: CPT | Mod: PN

## 2019-04-16 PROCEDURE — 63600175 PHARM REV CODE 636 W HCPCS: Mod: PN

## 2019-04-16 PROCEDURE — 96361 HYDRATE IV INFUSION ADD-ON: CPT | Mod: PN

## 2019-04-16 PROCEDURE — 96375 TX/PRO/DX INJ NEW DRUG ADDON: CPT | Mod: PN

## 2019-04-16 RX ORDER — ONDANSETRON 2 MG/ML
8 INJECTION INTRAMUSCULAR; INTRAVENOUS ONCE
Status: CANCELLED
Start: 2019-04-16

## 2019-04-16 RX ORDER — SODIUM CHLORIDE 0.9 % (FLUSH) 0.9 %
10 SYRINGE (ML) INJECTION
Status: CANCELLED | OUTPATIENT
Start: 2019-04-16

## 2019-04-16 RX ORDER — HEPARIN 100 UNIT/ML
500 SYRINGE INTRAVENOUS
Status: CANCELLED | OUTPATIENT
Start: 2019-04-16

## 2019-04-16 RX ORDER — ONDANSETRON 2 MG/ML
8 INJECTION INTRAMUSCULAR; INTRAVENOUS ONCE
Status: COMPLETED | OUTPATIENT
Start: 2019-04-16 | End: 2019-04-16

## 2019-04-16 RX ADMIN — ONDANSETRON 8 MG: 2 INJECTION, SOLUTION INTRAMUSCULAR; INTRAVENOUS at 12:04

## 2019-04-16 RX ADMIN — SODIUM CHLORIDE 2000 ML: 9 INJECTION, SOLUTION INTRAVENOUS at 12:04

## 2019-04-16 NOTE — PLAN OF CARE
Problem: Adult Inpatient Plan of Care  Goal: Plan of Care Review  Outcome: Ongoing (interventions implemented as appropriate)  Patient tolerated treatment without any complications. PAC NSL and deaccessed. D/C VS taken and within normal range. Patient aware of next appt date and time. Patient leaving with mother via wheelchair.

## 2019-04-16 NOTE — TELEPHONE ENCOUNTER
Received call from Elizabeth with Palliative Care/Dr. Nguyen stating patient is currently at [facility] for radiation tx and is c/o feeling weak, vomitting, feels like she has a bad bug, loss of bowel control. Dr. Nguyen wanted Dr. Rizzo to be aware.    I called [facility], spoke with HECTOR Huang who stated patient is seeing Dr. Scott today. Also gave Reina Rizzo's cell number as he is in [facility] today in the event Dr. Scott wishes to speak to Dr. Rizzo.    Forwarded this message to Dr. Rizzo, called [place] office and spoke with Kristal. Kristal will tell Dr. Rizzo as well.

## 2019-04-16 NOTE — PROGRESS NOTES
Met with pt chairside for follow up.  Pt hopeful that fluids will help her feel better. Provided support and encouragement. Bonny Quezada LCSW

## 2019-04-17 DIAGNOSIS — C34.11 MALIGNANT NEOPLASM OF UPPER LOBE OF RIGHT LUNG: Primary | ICD-10-CM

## 2019-04-18 ENCOUNTER — TELEPHONE (OUTPATIENT)
Dept: HEMATOLOGY/ONCOLOGY | Facility: CLINIC | Age: 34
End: 2019-04-18

## 2019-04-18 NOTE — TELEPHONE ENCOUNTER
Attempted to contact pt regarding her missed lab and hospital f/u today. Left message with contact number for a return call. Temporarily R/S pt to 5/1/19 with Dr Rizzo. Need to check with Dr Rizzo and also get infusion r/s.

## 2019-04-22 ENCOUNTER — TELEPHONE (OUTPATIENT)
Dept: PHARMACY | Facility: CLINIC | Age: 34
End: 2019-04-22

## 2019-04-22 NOTE — TELEPHONE ENCOUNTER
Attempted to contact patient in regards to Xalkori refill and f/u. Phone call was abruptly disconnected. Will continue to attempt to reach her.

## 2019-04-25 NOTE — TELEPHONE ENCOUNTER
Xalkori follow-up/refill:  Patient returned call for refill and follow up.  Ms. Krause is having a difficult time with therapy currently.  She confirmed appropriate dosing of Xalkori twice daily WITH food and utilizes appropriate handling.  Due to the side effects she is experiencing, her quality of life seems very poor at this time.  She does have a great support system and help from her parents.  She has not missed any doses at this time.  The following side effects were discussed at the time of the call:    - Nausea/vomiting - patient has a routine of alternating ondansetron and promethazine.  She does try to eat small snacks to help curb nausea, however there are times where the nausea is so severe, she cannot even eat bland foods such as crackers.  Advised trying to sip cold ginger ale or Coke slowly.  She won't need much, but often, the combination of sugar and carbonation is enough to settle her stomach to make eating easier.      -Diarrhea - patient is also struggling for control of her bladder and bowels.  She did confirm its much worse when she is receiving her radiation treatments to her pelvic area.  She does use Imodium when needed and confirmed it is helpful.    -Light sensitivity/vision changes - Ms. Krause has recently started to wear sunglasses inside due to extreme light sensitivity.  Much of this sensitivity is accompanied by dizziness and a stabbing pain over her right ocular region.  This past weekend, she even felt like her eyes were crossed for some period of time, although, she was not in control.  Her father is an EMT and noted that her eyes were reacting normally upon examination.  Also discussed having a formal eye exam performed to ensure there is no other cause of her vision problems.      She currently has 6 days of medication remaining.  Confirmed readiness for refill.  Medication will ship 4/29 for delivery on 4/30.  Copay $0.00 (004).  Address confirmed, two patient identifiers  confirmed - name and .  Therapy appropriate.      Will inform provider of issues noted above.  Will also request patient's current infusion appointment for fluids to be rescheduled to the [facility] infusion center as patient recently moved to the [facility] and the [facility] facilities are now an hour and a half away.  Will follow up with patient once we receive a response from provider.

## 2019-05-01 ENCOUNTER — OFFICE VISIT (OUTPATIENT)
Dept: HEMATOLOGY/ONCOLOGY | Facility: CLINIC | Age: 34
End: 2019-05-01
Payer: MEDICAID

## 2019-05-01 ENCOUNTER — LAB VISIT (OUTPATIENT)
Dept: LAB | Facility: HOSPITAL | Age: 34
End: 2019-05-01
Attending: INTERNAL MEDICINE
Payer: MEDICAID

## 2019-05-01 VITALS
WEIGHT: 158.31 LBS | OXYGEN SATURATION: 97 % | BODY MASS INDEX: 23.45 KG/M2 | RESPIRATION RATE: 18 BRPM | SYSTOLIC BLOOD PRESSURE: 98 MMHG | HEART RATE: 99 BPM | HEIGHT: 69 IN | TEMPERATURE: 99 F | DIASTOLIC BLOOD PRESSURE: 60 MMHG

## 2019-05-01 DIAGNOSIS — Z51.11 ENCOUNTER FOR ANTINEOPLASTIC CHEMOTHERAPY: ICD-10-CM

## 2019-05-01 DIAGNOSIS — Z09 ONCOLOGY FOLLOW-UP ENCOUNTER: ICD-10-CM

## 2019-05-01 DIAGNOSIS — C34.11 MALIGNANT NEOPLASM OF UPPER LOBE OF RIGHT LUNG: ICD-10-CM

## 2019-05-01 DIAGNOSIS — C79.31 METASTASIS TO BRAIN: Primary | ICD-10-CM

## 2019-05-01 DIAGNOSIS — J96.01 ACUTE HYPOXEMIC RESPIRATORY FAILURE: ICD-10-CM

## 2019-05-01 DIAGNOSIS — Z78.9 ON SUPPLEMENTAL OXYGEN BY NASAL CANNULA: ICD-10-CM

## 2019-05-01 LAB
ALBUMIN SERPL BCP-MCNC: 3 G/DL (ref 3.5–5.2)
ALP SERPL-CCNC: 267 U/L (ref 38–145)
ALT SERPL W/O P-5'-P-CCNC: 29 U/L (ref 10–44)
ANION GAP SERPL CALC-SCNC: 4 MMOL/L (ref 8–16)
AST SERPL-CCNC: 19 U/L (ref 14–36)
BASOPHILS # BLD AUTO: 0.02 K/UL (ref 0–0.2)
BASOPHILS NFR BLD: 0.5 % (ref 0–1.9)
BILIRUB SERPL-MCNC: 0.3 MG/DL (ref 0.2–1.3)
BUN SERPL-MCNC: 7 MG/DL (ref 7–18)
CALCIUM SERPL-MCNC: 8.2 MG/DL (ref 8.4–10.2)
CHLORIDE SERPL-SCNC: 101 MMOL/L (ref 95–110)
CO2 SERPL-SCNC: 31 MMOL/L (ref 22–31)
CREAT SERPL-MCNC: 0.79 MG/DL (ref 0.5–1.4)
DIFFERENTIAL METHOD: ABNORMAL
EOSINOPHIL # BLD AUTO: 1.2 K/UL (ref 0–0.5)
EOSINOPHIL NFR BLD: 29.5 % (ref 0–8)
ERYTHROCYTE [DISTWIDTH] IN BLOOD BY AUTOMATED COUNT: 16 % (ref 11.5–14.5)
EST. GFR  (AFRICAN AMERICAN): >60 ML/MIN/1.73 M^2
EST. GFR  (NON AFRICAN AMERICAN): >60 ML/MIN/1.73 M^2
GLUCOSE SERPL-MCNC: 84 MG/DL (ref 70–110)
HCT VFR BLD AUTO: 28.1 % (ref 37–48.5)
HGB BLD-MCNC: 9 G/DL (ref 12–16)
IMM GRANULOCYTES # BLD AUTO: 0.02 K/UL (ref 0–0.04)
IMM GRANULOCYTES NFR BLD AUTO: 0.5 % (ref 0–0.5)
LDH SERPL L TO P-CCNC: 521 U/L (ref 313–618)
LYMPHOCYTES # BLD AUTO: 0.7 K/UL (ref 1–4.8)
LYMPHOCYTES NFR BLD: 17.3 % (ref 18–48)
MAGNESIUM SERPL-MCNC: 1.9 MG/DL (ref 1.6–2.6)
MCH RBC QN AUTO: 25.1 PG (ref 27–31)
MCHC RBC AUTO-ENTMCNC: 32 G/DL (ref 32–36)
MCV RBC AUTO: 79 FL (ref 82–98)
MONOCYTES # BLD AUTO: 0.4 K/UL (ref 0.3–1)
MONOCYTES NFR BLD: 8.6 % (ref 4–15)
NEUTROPHILS # BLD AUTO: 1.8 K/UL (ref 1.8–7.7)
NEUTROPHILS NFR BLD: 43.6 % (ref 38–73)
NRBC BLD-RTO: 0 /100 WBC
PLATELET # BLD AUTO: 159 K/UL (ref 150–350)
PMV BLD AUTO: 9 FL (ref 9.2–12.9)
POTASSIUM SERPL-SCNC: 3.9 MMOL/L (ref 3.5–5.1)
PROT SERPL-MCNC: 5.8 G/DL (ref 6–8.4)
RBC # BLD AUTO: 3.58 M/UL (ref 4–5.4)
SODIUM SERPL-SCNC: 136 MMOL/L (ref 136–145)
WBC # BLD AUTO: 4.21 K/UL (ref 3.9–12.7)

## 2019-05-01 PROCEDURE — 85025 COMPLETE CBC W/AUTO DIFF WBC: CPT | Mod: PN

## 2019-05-01 PROCEDURE — 80053 COMPREHEN METABOLIC PANEL: CPT

## 2019-05-01 PROCEDURE — 99213 OFFICE O/P EST LOW 20 MIN: CPT | Mod: PBBFAC,PN | Performed by: INTERNAL MEDICINE

## 2019-05-01 PROCEDURE — 80053 COMPREHEN METABOLIC PANEL: CPT | Mod: PN

## 2019-05-01 PROCEDURE — 83615 LACTATE (LD) (LDH) ENZYME: CPT

## 2019-05-01 PROCEDURE — 99215 OFFICE O/P EST HI 40 MIN: CPT | Mod: S$PBB,,, | Performed by: INTERNAL MEDICINE

## 2019-05-01 PROCEDURE — 99999 PR PBB SHADOW E&M-EST. PATIENT-LVL III: ICD-10-PCS | Mod: PBBFAC,,, | Performed by: INTERNAL MEDICINE

## 2019-05-01 PROCEDURE — 83735 ASSAY OF MAGNESIUM: CPT | Mod: PN

## 2019-05-01 PROCEDURE — 85025 COMPLETE CBC W/AUTO DIFF WBC: CPT

## 2019-05-01 PROCEDURE — 99215 PR OFFICE/OUTPT VISIT, EST, LEVL V, 40-54 MIN: ICD-10-PCS | Mod: S$PBB,,, | Performed by: INTERNAL MEDICINE

## 2019-05-01 PROCEDURE — 99999 PR PBB SHADOW E&M-EST. PATIENT-LVL III: CPT | Mod: PBBFAC,,, | Performed by: INTERNAL MEDICINE

## 2019-05-01 PROCEDURE — 36415 COLL VENOUS BLD VENIPUNCTURE: CPT | Mod: PN

## 2019-05-01 PROCEDURE — 83615 LACTATE (LD) (LDH) ENZYME: CPT | Mod: PN

## 2019-05-01 PROCEDURE — 83735 ASSAY OF MAGNESIUM: CPT

## 2019-05-01 RX ORDER — PROCHLORPERAZINE MALEATE 10 MG
10 TABLET ORAL 3 TIMES DAILY PRN
Refills: 3 | COMMUNITY
Start: 2019-04-16

## 2019-05-01 NOTE — PROGRESS NOTES
HPI    33 years old  female presented to Oncology floor with prior diagnosis of lung cancer Mets to brain.    Patient was complaining of productive cough with bloody sputum increased shortness of breath x1 day.  Patient also complaining of fever.  Patient is scheduled to start radiation to the brain this week.  Patient is also scheduled to have a PET-CT done tomorrow.    Cytology specimen fine-needle on February 11, 2019 by pulmonology shows chest station 7 lymph nodes biopsy positive for adenocarcinoma, chest station 4R lymph node positive for adenocarcinoma and left upper lobe endotracheal lesion biopsy positive for adenocarcinoma.  MRI on February 13, 2019 shows extensive supratentorial intracranial med metastatic disease corresponding to history of adenocarcinoma of the lung recent diagnosis.    Patient is here for follow-up.  Declined chemotherapy.  However patient has agreed for targeted therapy.  ROS1 positive mutation, patient on crizotinib 250mg PO Bid 1 months into therapy.  Tolerated medication well. No complaints on today's visit.  Patient is here in company of her father.  Use nasal cannula O2 support at home.          Past Medical History:   Diagnosis Date    Anxiety     Autonomic dysfunction     Chiari malformation type I     Depressed     Ayesha-Danlos syndrome     Frequent headaches     General anesthetics causing adverse effect in therapeutic use     due to syndrome --metablolize anesthetics very fast    History of UTI     Lung cancer     Narcolepsy     with cataplexy    POTS (postural orthostatic tachycardia syndrome)     TB lung, latent      Past Surgical History:   Procedure Laterality Date    BRONCHOSCOPY, WITH FLUOROSCOPY N/A 1/29/2019    Performed by San[facility]hon Jr., DO at [facility] ENDO    CHOLECYSTECTOMY      CRANIOPLASTY FOR CRANIAL DEFECT      cranial decompression    ENDOBRONCHIAL ULTRASOUND (EBUS) N/A 2/11/2019    Performed by Sanjay Mcmahon Jr., DO at [facility] CSC     [id number] N/A 4/11/2019    Performed by Jasmyne Padron MD at [facility] OR    LAMINECTOMY      SHOULDER ARTHROSCOPY      right x's 2    TUBAL LIGATION       Family History   Problem Relation Age of Onset    No Known Problems Mother     No Known Problems Father      Social History     Socioeconomic History    Marital status:      Spouse name: Not on file    Number of children: Not on file    Years of education: Not on file    Highest education level: Not on file   Occupational History    Not on file   Social Needs    Financial resource strain: Not on file    Food insecurity:     Worry: Not on file     Inability: Not on file    Transportation needs:     Medical: Not on file     Non-medical: Not on file   Tobacco Use    Smoking status: Never Smoker    Smokeless tobacco: Never Used   Substance and Sexual Activity    Alcohol use: Yes     Frequency: Never     Comment: rarely    Drug use: Yes     Types: Cocaine, MDMA (Ecstacy)    Sexual activity: Not on file   Lifestyle    Physical activity:     Days per week: Not on file     Minutes per session: Not on file    Stress: Not on file   Relationships    Social connections:     Talks on phone: Not on file     Gets together: Not on file     Attends Gnosticism service: Not on file     Active member of club or organization: Not on file     Attends meetings of clubs or organizations: Not on file     Relationship status: Not on file   Other Topics Concern    Not on file   Social History Narrative    Not on file     Review of patient's allergies indicates:   Allergen Reactions    Sulfa (sulfonamide antibiotics)      Physical exam    Gen:  Alert oriented answering question follows command  HEENT:  Normocephalic atraumatic on nasal cannula.  Neck:  No JVD  Cardiovascular:  Regular rhythm  Pulmonary:  Decreasing breath sounds  Abdomen: soft nontender nondistended  Neuro:  Grossly normal  Extremity:  No edema  Skin:  No lesion or  rhonchi  Psych:  Tearful, patient wishes everything to be done with aggressive management        Lab Results   Component Value Date    WBC 4.21 05/01/2019    HGB 9.0 (L) 05/01/2019    HCT 28.1 (L) 05/01/2019    MCV 79 (L) 05/01/2019     05/01/2019     CMP  Sodium   Date Value Ref Range Status   05/01/2019 136 136 - 145 mmol/L Final     Potassium   Date Value Ref Range Status   05/01/2019 3.9 3.5 - 5.1 mmol/L Final     Chloride   Date Value Ref Range Status   05/01/2019 101 95 - 110 mmol/L Final     CO2   Date Value Ref Range Status   05/01/2019 31 22 - 31 mmol/L Final     Glucose   Date Value Ref Range Status   05/01/2019 84 70 - 110 mg/dL Final     Comment:     The ADA recommends the following guidelines for fasting glucose:  Normal:       less than 100 mg/dL  Prediabetes:  100 mg/dL to 125 mg/dL  Diabetes:     126 mg/dL or higher       BUN, Bld   Date Value Ref Range Status   05/01/2019 7 7 - 18 mg/dL Final     Creatinine   Date Value Ref Range Status   05/01/2019 0.79 0.50 - 1.40 mg/dL Final     Calcium   Date Value Ref Range Status   05/01/2019 8.2 (L) 8.4 - 10.2 mg/dL Final     Total Protein   Date Value Ref Range Status   05/01/2019 5.8 (L) 6.0 - 8.4 g/dL Final     Albumin   Date Value Ref Range Status   05/01/2019 3.0 (L) 3.5 - 5.2 g/dL Final     Total Bilirubin   Date Value Ref Range Status   05/01/2019 0.3 0.2 - 1.3 mg/dL Final     Alkaline Phosphatase   Date Value Ref Range Status   05/01/2019 267 (H) 38 - 145 U/L Final     AST   Date Value Ref Range Status   05/01/2019 19 14 - 36 U/L Final     ALT   Date Value Ref Range Status   05/01/2019 29 10 - 44 U/L Final     Anion Gap   Date Value Ref Range Status   05/01/2019 4 (L) 8 - 16 mmol/L Final     eGFR if    Date Value Ref Range Status   05/01/2019 >60 >60 mL/min/1.73 m^2 Final     eGFR if non    Date Value Ref Range Status   05/01/2019 >60 >60 mL/min/1.73 m^2 Final     Comment:     Calculation used to obtain the  estimated glomerular filtration  rate (eGFR) is the CKD-EPI equation.          MRI February 14, 2019  IMPRESSION:  1. Extensive supratentorial intracranial metastatic disease as described above.  2. Chiari decompression with no evidence of tonsillar herniation or crowding.  Message left for Dr. Mcmahon at 11:05 on 02/15/2019.      February 7, 2019   IMPRESSION  1. Small right pleural effusion is demonstrated.  2. There is patchy bilateral multifocal, nodule, consolidation most pronounced with masslike consolidative coalescence of the right upper lobe, perihilar region. This corresponds with bronchial narrowing, partial obstructive appearance as well as   abnormally enlarged adjacent mediastinal, hilar lymph nodes.    January 29, 2019 pathology surgery non biopsy right upper lobe transbronchial  DIAGNOSIS:   01/30/19 JL:melvi    RIGHT UPPER LOBE TRANSBRONCHIAL LUNG BIOPSY:  - NECROTIC NODULE WITH REACTIVE FIBROSIS.      February 11, 2019 cytology specimen fine needle    DIAGNOSIS:   02/12/19    VENESSA/sdc    1.  CHEST STATION 7 LYMPH NODE, FINE NEEDLE ASPIRATE (EBUS, 8 PASSES):  - POSITIVE FOR ADENOCARCINOMA.     2.  CHEST STATION 4R LYMPH NODE, FINE NEEDLE ASPIRATE (8 PASSES):  - POSITIVE FOR ADENOCARCINOMA.     3.  LEFT UPPER LOBE ENDOBRONCHIAL LESION, FINE NEEDLE ASPIRATE (5   PASSES):  - POSITIVE FOR ADENOCARCINOMA.      February 12, 2019:    EGFR mutation Exon 19,21, 20 all negative  Alk fusion EML4 negative   ROS1 fusion CD 74, SDC4, [id number], EZR, TPM3 negative   RET Fusion KIF5B, CCDC6, TRIM33 all TNP  KRAS mutation all negative   BRAF mutation negative       PDL1 14% expression         Impression MRI February 13, 2019       1. Extensive supratentorial intracranial metastatic disease as described above.  2. Chiari decompression with no evidence of tonsillar herniation or crowding.  Message left for Dr. Mcmahon at 11:05 on 02/15/2019.         Assessment plan    [] Stage IV adenocarcinoma of lung metastatic to  brain.  Negative targeted mutation study including EGFR, Alk, BRAF, RET.  Positive for ROS1 .    -ECOG performance score 1  -PD L1 14% suppression    [] Anemia multifactorial chemo induced versus malignancy.  -none intervention is needed at this time    [] Patient was previously expressed interest not undergo chemotherapy per for palliation only.  In the light of ROS1 positive mutation, patient agreed to crizotinib 250mg PO Bid to disease progression or intolerable toxicity.    [] CT scan evaluation for treatment response.  RTC 2 weeks with images and blood work.    Dictation software used expect typo graphic error

## 2019-05-09 PROBLEM — C79.31 METASTATIC ADENOCARCINOMA TO BRAIN: Status: ACTIVE | Noted: 2019-05-09

## 2019-05-09 PROBLEM — G93.5 CHIARI I MALFORMATION: Status: ACTIVE | Noted: 2019-05-09

## 2019-05-09 PROBLEM — R53.1 WEAKNESS: Status: ACTIVE | Noted: 2019-05-09

## 2019-05-09 PROBLEM — R11.10 VOMITING: Status: ACTIVE | Noted: 2019-05-09

## 2019-05-09 PROBLEM — R11.2 INTRACTABLE VOMITING WITH NAUSEA: Status: ACTIVE | Noted: 2019-05-09

## 2019-05-09 PROBLEM — C34.90 MALIGNANT NEOPLASM OF LUNG: Status: ACTIVE | Noted: 2019-05-09

## 2019-05-10 PROBLEM — R53.83 FATIGUE: Status: ACTIVE | Noted: 2019-05-09

## 2019-05-10 PROBLEM — N30.00 ACUTE CYSTITIS WITHOUT HEMATURIA: Status: ACTIVE | Noted: 2019-05-10

## 2019-05-13 PROBLEM — K59.01 SLOW TRANSIT CONSTIPATION: Status: ACTIVE | Noted: 2019-05-13

## 2019-05-14 PROBLEM — K59.03 DRUG INDUCED CONSTIPATION: Status: ACTIVE | Noted: 2019-05-13

## 2019-05-17 PROBLEM — I63.9 ACUTE CVA (CEREBROVASCULAR ACCIDENT): Status: ACTIVE | Noted: 2019-05-17

## 2019-05-20 PROBLEM — Z71.89 GOALS OF CARE, COUNSELING/DISCUSSION: Status: ACTIVE | Noted: 2019-05-01

## 2019-05-22 ENCOUNTER — DOCUMENTATION ONLY (OUTPATIENT)
Dept: HEMATOLOGY/ONCOLOGY | Facility: CLINIC | Age: 34
End: 2019-05-22

## 2019-05-23 ENCOUNTER — TELEPHONE (OUTPATIENT)
Dept: PHARMACY | Facility: CLINIC | Age: 34
End: 2019-05-23

## 2019-06-03 PROBLEM — Z09 ONCOLOGY FOLLOW-UP ENCOUNTER: Status: RESOLVED | Noted: 2019-02-26 | Resolved: 2019-06-03

## 2023-05-10 NOTE — TELEPHONE ENCOUNTER
Per patient chart, it appears she will be discontinuing therapy with [facility] and proceeding with hospice.  Attempted to reach patient to provide disposal information.  Voicemail was full.  Will close patient out of [facility] at this time and inform provider.   Statement Selected